# Patient Record
Sex: MALE | Race: WHITE | NOT HISPANIC OR LATINO
[De-identification: names, ages, dates, MRNs, and addresses within clinical notes are randomized per-mention and may not be internally consistent; named-entity substitution may affect disease eponyms.]

---

## 2022-07-02 ENCOUNTER — APPOINTMENT (OUTPATIENT)
Dept: ORTHOPEDIC SURGERY | Facility: CLINIC | Age: 52
End: 2022-07-02

## 2022-07-02 VITALS — BODY MASS INDEX: 21.77 KG/M2 | WEIGHT: 147 LBS | HEIGHT: 69 IN

## 2022-07-02 DIAGNOSIS — M25.512 PAIN IN LEFT SHOULDER: ICD-10-CM

## 2022-07-02 DIAGNOSIS — M75.81 OTHER SHOULDER LESIONS, RIGHT SHOULDER: ICD-10-CM

## 2022-07-02 DIAGNOSIS — M75.50 BURSITIS OF UNSPECIFIED SHOULDER: ICD-10-CM

## 2022-07-02 PROBLEM — Z00.00 ENCOUNTER FOR PREVENTIVE HEALTH EXAMINATION: Status: ACTIVE | Noted: 2022-07-02

## 2022-07-02 PROCEDURE — 99214 OFFICE O/P EST MOD 30 MIN: CPT | Mod: 25

## 2022-07-02 PROCEDURE — 73010 X-RAY EXAM OF SHOULDER BLADE: CPT | Mod: LT

## 2022-07-02 PROCEDURE — J3490M: CUSTOM

## 2022-07-02 PROCEDURE — 73030 X-RAY EXAM OF SHOULDER: CPT | Mod: LT

## 2022-07-02 PROCEDURE — 20610 DRAIN/INJ JOINT/BURSA W/O US: CPT | Mod: LT

## 2022-07-19 NOTE — PHYSICAL EXAM
[Left] : left shoulder [Sitting] : sitting [Standing] : standing [Moderate] : moderate [] : negative Lázaro [FreeTextEntry9] : \par ER 30\par IR T8

## 2022-07-19 NOTE — DISCUSSION/SUMMARY
[de-identified] : discussed options. left shoulder SAC at this time. HEP. fu prn\par \par The patient was advised of the diagnosis.  The natural history of the pathology was explained in full. All questions were answered.  The risks and benefits of conservative and interventional treatment alternatives were explained to the patient\par \par Large joint corticosteroid injection given:\par \par Patient indicated for injection after trial of rest, OTC medications including aspirin, Ibuprofen, Aleve etc or prescription NSAIDS, and/or exercises at home and/ or physical therapy without satisfactory response.  Patient has symptoms including pain, swelling, and/or decreased mobility in the joint. The risks, benefits, and alternatives to corticosteroid injection were explained in full to the patient, including but not limited to infection, sepsis, bleeding, scarring, skin discoloration, temporary increase in pain, syncopal episode, failure to resolve symptoms, allergic reaction, symptom recurrence, and elevation of blood sugar in diabetics. Patient understood the risks. All questions were answered. After discussion of options, patient requested an injection. \par \par Oral informed consent was obtained and sterile technique was utilized for the procedure including the preparation of the solutions used for the injection and betadine followed by alcohol prep to the injection site. Anesthesia was given with ethyl chloride sprayed topically. The injection was delivered. Patient tolerated the procedure well. \par \par Post Procedure Instructions: Patient was advised to call if redness, pain, or fever occur and apply ice for 15 min on and 15 min off later today\par \par Medications delivered: Depomedrol: 40 mg, Lidocaine: 4 cc, Marcaine: 4 cc.\par \par

## 2022-07-19 NOTE — HISTORY OF PRESENT ILLNESS
[Gradual] : gradual [5] : 5 [2] : 2 [Dull/Aching] : dull/aching [Constant] : constant [Full time] : Work status: full time [de-identified] : 52M RHD (PS21 ) p/w LEFT arm/shoulder pain x 2 weeks. Sensation of having a "dead arm" at top of arm - deltoid area. Pain lifting a box 7/1/22. Positional pain qhs. He plays softball and rested the past couple weeks from playing. Shooting a basketball is painful. Raising arm OH aggravates. No radiating n/t. He has been stretching. \par \par Prior similar hx R arm - good response csi in the past.\par \par pmh: non contributory  [] : no [FreeTextEntry1] : left arm  [FreeTextEntry5] : patient presents with left arm pain since 2 weeks. patient doesn’t recall any injury to the arm  [de-identified] : movement

## 2022-07-22 ENCOUNTER — APPOINTMENT (OUTPATIENT)
Dept: ORTHOPEDIC SURGERY | Facility: CLINIC | Age: 52
End: 2022-07-22

## 2022-07-22 VITALS — BODY MASS INDEX: 21.77 KG/M2 | HEIGHT: 69 IN | WEIGHT: 147 LBS

## 2022-07-22 PROCEDURE — 99213 OFFICE O/P EST LOW 20 MIN: CPT

## 2022-07-22 RX ORDER — DICLOFENAC SODIUM 75 MG/1
75 TABLET, DELAYED RELEASE ORAL
Qty: 28 | Refills: 0 | Status: ACTIVE | COMMUNITY
Start: 2022-07-22 | End: 1900-01-01

## 2022-07-22 NOTE — ASSESSMENT
[FreeTextEntry1] : 7/22/22- L shoulder improved after CSI. Diclofenac sent in. HEP. discussed role of mri he may call to set this up if pain returns.

## 2022-07-22 NOTE — HISTORY OF PRESENT ILLNESS
[5] : 5 [3] : 3 [de-identified] : 52M RHD (PS21 ) p/w LEFT arm/shoulder pain x 2 weeks. Sensation of having a "dead arm" at top of arm - deltoid area. Pain lifting a box 7/1/22. Positional pain qhs. He plays softball and rested the past couple weeks from playing. Shooting a basketball is painful. Raising arm OH aggravates. No radiating n/t. He has been stretching. \par \par Prior similar hx R arm - good response csi in the past.\par \par pmh: non contributory \par \par 7/22/22: Here for fu R shoulder. Had CSI last visit. Pain only just started to improve a few days ago. He is traveling to [place] next week. Taking OTC NSAIDS [FreeTextEntry1] : left shoulder  [de-identified] : PT

## 2022-09-20 ENCOUNTER — APPOINTMENT (OUTPATIENT)
Dept: ORTHOPEDIC SURGERY | Facility: CLINIC | Age: 52
End: 2022-09-20

## 2022-09-20 VITALS — BODY MASS INDEX: 21.77 KG/M2 | HEIGHT: 69 IN | WEIGHT: 147 LBS

## 2022-09-20 PROCEDURE — 99214 OFFICE O/P EST MOD 30 MIN: CPT

## 2022-09-20 RX ORDER — METHYLPREDNISOLONE 4 MG/1
4 TABLET ORAL
Qty: 1 | Refills: 0 | Status: ACTIVE | COMMUNITY
Start: 2022-09-20 | End: 1900-01-01

## 2022-09-20 NOTE — HISTORY OF PRESENT ILLNESS
[5] : 5 [2] : 2 [Frequent] : frequent [Lying in bed] : lying in bed [de-identified] : perisstent right shoulder pain despite csi and time. notes pain lateral shoulder. waekness with lifting. +night pain. also complaining of signfiicant neck pain. \par \par ------------------------------------------------------------------------------------------------------------------------------------------------------\par \par 52M RHD (PS21 ) p/w LEFT arm/shoulder pain x 2 weeks. Sensation of having a "dead arm" at top of arm - deltoid area. Pain lifting a box 7/1/22. Positional pain qhs. He plays softball and rested the past couple weeks from playing. Shooting a basketball is painful. Raising arm OH aggravates. No radiating n/t. He has been stretching. \par \par pmh: non contributory \par  [] : no [FreeTextEntry1] : left shoulder

## 2022-09-20 NOTE — DISCUSSION/SUMMARY
[de-identified] : recommend mri left shoulder ro biceps tendinopathy.  mri cs ro hnp. fu p mri . MDP\par \par ------------------------------------------------------------------------------------------------------------------------------------------------------\par \par The patient was advised of the diagnosis.  The natural history of the pathology was explained in full. All questions were answered.  The risks and benefits of conservative and interventional treatment alternatives were explained to the patient\par \par ------------------------------------------------------------------------------------------------------------------------------------------------------\par \par The patient was advised that an advanced diagnostic/imaging study (MRI/CT/etc) will be ordered to evaluate potential pathology in the affected area(s).  The patient was further advised to follow up in the office to review the results of the study and determine further management that may be indicated.\par \par

## 2022-09-30 ENCOUNTER — FORM ENCOUNTER (OUTPATIENT)
Age: 52
End: 2022-09-30

## 2022-10-01 ENCOUNTER — APPOINTMENT (OUTPATIENT)
Dept: MRI IMAGING | Facility: CLINIC | Age: 52
End: 2022-10-01

## 2022-10-01 PROCEDURE — 73221 MRI JOINT UPR EXTREM W/O DYE: CPT | Mod: LT

## 2022-10-12 ENCOUNTER — APPOINTMENT (OUTPATIENT)
Dept: ORTHOPEDIC SURGERY | Facility: CLINIC | Age: 52
End: 2022-10-12

## 2022-10-12 VITALS — BODY MASS INDEX: 21.77 KG/M2 | WEIGHT: 147 LBS | HEIGHT: 69 IN

## 2022-10-12 DIAGNOSIS — M54.12 RADICULOPATHY, CERVICAL REGION: ICD-10-CM

## 2022-10-12 DIAGNOSIS — S43.432A SUPERIOR GLENOID LABRUM LESION OF LEFT SHOULDER, INITIAL ENCOUNTER: ICD-10-CM

## 2022-10-12 DIAGNOSIS — M75.52 BURSITIS OF LEFT SHOULDER: ICD-10-CM

## 2022-10-12 DIAGNOSIS — M75.82 OTHER SHOULDER LESIONS, LEFT SHOULDER: ICD-10-CM

## 2022-10-12 PROCEDURE — 99214 OFFICE O/P EST MOD 30 MIN: CPT

## 2022-10-12 RX ORDER — METHYLPREDNISOLONE 4 MG/1
4 TABLET ORAL
Qty: 1 | Refills: 0 | Status: ACTIVE | COMMUNITY
Start: 2022-10-12 | End: 1900-01-01

## 2022-12-14 NOTE — IMAGING
[de-identified] : \par ------------------------------------------------------------------------------------------------------------------------------------------------------\par \par Left shoulder exam: \par \par Inspection: no obvious deformity, no obvious masses, no swelling, no effusion, no atrophy\par ROM: \par    FF: 180\par    ER: 70\par    IR: T12\par Tenderness:\par    +Anterior/Biceps: \par    (-) Posterior\par    (-) Lateral\par    (-) Trapezius\par    (-) Scapula\par    (-) AC joint\par    (-) Crepitus with ROM\par Additional tests: \par    +Neer's\par    (-) Hawkin's\par    (-) Chairez's\par    (-) Speed\par    (-) Cross chest adduction\par   +pain with liftoff and belly press\par Strength: \par    FF: 5/5\par    ER: 5/5\par    IR: 5/5\par    Biceps: 5/5\par    Triceps: 5/5\par    Distal: 5/5\par Neuro: In tact to light touch throughout\par Vascularity: Extremity warm and well perfused\par \par

## 2022-12-14 NOTE — DATA REVIEWED
[MRI] : MRI [Left] : left [Shoulder] : shoulder [I independently reviewed and interpreted images and report] : I independently reviewed and interpreted images and report [FreeTextEntry1] : small superrio subscap tear, slap tear. bursitis.

## 2022-12-14 NOTE — DISCUSSION/SUMMARY
[de-identified] : discussed mri results. i am still concerned for cervical pathology, though subscap tear and slap tear may be contributing. he had no previous relief from SAC, may consider IAC. he will do MDP and acupuncture for now .\par \par ------------------------------------------------------------------------------------------------------------------------------------------------------\par \par The patient was advised of the diagnosis.  The natural history of the pathology was explained in full. All questions were answered.  The risks and benefits of conservative and interventional treatment alternatives were explained to the patient\par \par ------------------------------------------------------------------------------------------------------------------------------------------------------\par \par MRI(s) and/or other advanced imaging studies (CT/etc) were reviewed with the patient. Implications of the study(ies) together with the patient's clinical picture were discussed to formulate a working diagnosis and management options were detailed.\par \par

## 2022-12-14 NOTE — HISTORY OF PRESENT ILLNESS
[5] : 5 [2] : 2 [Frequent] : frequent [Lying in bed] : lying in bed [de-identified] : perisstent right shoulder pain despite csi and time. notes pain lateral shoulder. waekness with lifting. +night pain. also complaining of signfiicant neck pain. \par \par ------------------------------------------------------------------------------------------------------------------------------------------------------\par \par 52M RHD (PS21 ) p/w LEFT arm/shoulder pain x 2 weeks. Sensation of having a "dead arm" at top of arm - deltoid area. Pain lifting a box 7/1/22. Positional pain qhs. He plays softball and rested the past couple weeks from playing. Shooting a basketball is painful. Raising arm OH aggravates. No radiating n/t. He has been stretching. \par \par pmh: non contributory \par  [] : no [FreeTextEntry1] : left shoulder  [de-identified] : none

## 2023-08-07 ENCOUNTER — APPOINTMENT (OUTPATIENT)
Dept: ORTHOPEDIC SURGERY | Facility: CLINIC | Age: 53
End: 2023-08-07